# Patient Record
Sex: MALE | Race: WHITE
[De-identification: names, ages, dates, MRNs, and addresses within clinical notes are randomized per-mention and may not be internally consistent; named-entity substitution may affect disease eponyms.]

---

## 2018-03-13 ENCOUNTER — HOSPITAL ENCOUNTER (EMERGENCY)
Dept: HOSPITAL 80 - FED | Age: 53
Discharge: HOME | End: 2018-03-13
Payer: COMMERCIAL

## 2018-03-13 VITALS
HEART RATE: 74 BPM | RESPIRATION RATE: 16 BRPM | OXYGEN SATURATION: 93 % | TEMPERATURE: 97.9 F | SYSTOLIC BLOOD PRESSURE: 159 MMHG | DIASTOLIC BLOOD PRESSURE: 95 MMHG

## 2018-03-13 DIAGNOSIS — R41.89: ICD-10-CM

## 2018-03-13 DIAGNOSIS — G40.409: Primary | ICD-10-CM

## 2018-03-13 NOTE — EDPHY
H & P


Stated Complaint: siezures starting last Thurs, cognitive changes, ? withdrawl 

from ambien


Time Seen by Provider: 03/13/18 13:28





- Personal History


Current Tetanus/Diphtheria Vaccine: Yes


Current Tetanus Diphtheria and Acellular Pertussis (TDAP): Yes


Tetanus Vaccine Date: 2012





- Medical/Surgical History


Hx Asthma: No


Hx Chronic Respiratory Disease: No


Hx Diabetes: No


Hx Cardiac Disease: No


Hx Renal Disease: No


Hx Cirrhosis: No


Hx Alcoholism: No


Hx HIV/AIDS: No


Hx Splenectomy or Spleen Trauma: No


Other PMH: hyperlipidemia





- Social History


Smoking Status: Never smoked


Constitutional: 


 Initial Vital Signs











Temperature (C)  37.1 C   03/13/18 12:21


 


Heart Rate  76   03/13/18 12:21


 


Respiratory Rate  1 L  03/13/18 12:21


 


Blood Pressure  161/101 H  03/13/18 12:21


 


O2 Sat (%)  95   03/13/18 12:21








 











O2 Delivery Mode               Room Air














Allergies/Adverse Reactions: 


 





aspirin Allergy (Intermediate, Verified 03/13/18 12:20)


 


Sulfa (Sulfonamide Antibiotics) [Sulfa(Sulfonamide Antibiotics)] Allergy (

Verified 03/13/18 12:20)


 








Home Medications: 














 Medication  Instructions  Recorded


 


Atorvastatin Calcium [Lipitor 40 40 mg PO DAILY 09/14/13





mg (RX)]  














Medical Decision Making





- Diagnostics


Imaging: Discussed imaging studies w/ On call Radiologist, I viewed and 

interpreted images myself


ED Course/Re-evaluation: 





CHIEF COMPLAINT:  Cognitive changes and arm "seizures"





HISTORY OF PRESENT ILLNESS:  The patient is a 52 y/o male complaining of 

cognitive issues and right arm "seizures" for the last 3 weeks since stopping 

Ambien use. He says, "I've been having some disorientation that started 3 weeks 

ago," which he describes as "lane-vu with anxiety and panic in there that 

passes quickly." Last Thursday, 5 days ago, "I started having a physical 

seizure in my arm that has occurred continuously every 5 minutes." He describes 

this as a right arm spasm that he can't control and occasional an associated 

small jerk in his right leg. He correlates his symptoms with stopping Ambien 

and reports he had no symptoms prior to discontinuance of this medication. He 

saw his PCP today for this and had labs drawn and an EEG ordered. He denies 

neck or back pain, headache, fever, recent trauma, or infectious symptoms. He 

has been able to exercise and ride his bike through these "seizures," but is 

able to pull his hand off the bike when they occur. He is normally healthy, but 

notes he's been under a lot of stress recently. 





REVIEW OF SYSTEMS:  





A 10 point review of systems was performed and is negative with the exception 

of the elements mentioned in the history of present illness.





PHYSICAL EXAM:  





HR, BP, O2 Sat, RR.  Temp noted


General Appearance:  Alert, well hydrated, appropriate, and non-toxic appearing.


Head:  Atraumatic without scalp tenderness or obvious injury


Eyes:  Pupils equal, round, reactive to light and accommodation, EOMI, no trauma

, no injection.


Nose:  Atraumatic, no rhinorrhea, clear.


Throat: Mucus membranes moist.


Neck:  Supple, nontender, no lymphadenopathy.


Respiratory:  No retractions, no distress, no wheezes, and no accessory muscle 

use.  Lungs are clear to auscultation bilaterally.


Cardiovascular:  Regular rate and rhythm, no murmurs, rubs, or gallops. Good 

capillary refill all extremities.


Gastrointestinal:  Abdomen is soft, nontender, non-distended, no masses, no 

rebound, no guarding, no peritoneal signs.


Musculoskeletal:  Normal active ROM of all extremities, atraumatic.


Neurological:  Alert, appropriate, and interactive. Isolated right upper 

extremity contracture that lasts 2-3 seconds and includes hand, otherwise non-

focal.


Skin:  No rashes, good turgor, no nodules on palpation.





Past medical history: Hyperlipidemia


Past surgical history: Denies


Family history: Noncontributory


Social history: Lives in [place], , employed.





DIAGNOSTICS/PROCEDURES/CRITICAL CARE TIME:  





Head CT and CTA: negative





DIFFERENTIAL DIAGNOSIS:   The differential diagnosis for the patient's seizure 

included but was not limited to electrolyte abnormality, alcohol withdrawal, 

medication noncompliance, head injury, CNS structural abnormality, and break 

through seizure.





MEDICAL DECISION MAKING:  





This is a normally healthy 52 y/o male who presents with a 3-week history of 

cognitive changes described as "disorientation" and lane vu" now associated 

with a right upper arm spasm every few minutes for the last 5 days. During 

assessment he does appear to have an involuntary 2-3 second contracture of his 

right arm with finger involvement. Otherwise normal neuro exam. He does appear 

quite anxious. Labs performed by his PCP this morning were normal. Plan for 

head CT and CTA to rule out structural causes for what appears to be a brief 

focal motor seizure. 





Reassessed patient and discussed results. Imaging is negative. Patient will be 

referred to neurology for follow up with standard precautions. He is 

comfortable with this plan. 





Departure





- Departure


Disposition: Home, Routine, Self-Care


Clinical Impression: 


 Cognitive changes, Focal motor seizure disorder, of right arm





Condition: Good


Instructions:  New-Onset Seizure in Adults (ED)


Additional Instructions: 


Follow up with neurologist this week. Do perform activities that could put you 

or others at risk in case of recurrent seizure until cleared by neurologist. 

Return to the ED for any worsening of condition. 


Referrals: 


Syeda Verdugo PA [Primary Care Provider] - As per Instructions


Dewey Ayers MD [Medical Doctor] - As per Instructions


Report Scribed for: Arden Thakur


Report Scribed by: Brie Monique


Date of Report: 03/13/18


Time of Report: 13:54

## 2018-03-15 ENCOUNTER — HOSPITAL ENCOUNTER (OUTPATIENT)
Dept: HOSPITAL 80 - FED | Age: 53
Setting detail: OBSERVATION
LOS: 1 days | Discharge: HOME | End: 2018-03-16
Attending: INTERNAL MEDICINE | Admitting: INTERNAL MEDICINE
Payer: COMMERCIAL

## 2018-03-15 VITALS — RESPIRATION RATE: 16 BRPM

## 2018-03-15 DIAGNOSIS — F43.22: ICD-10-CM

## 2018-03-15 DIAGNOSIS — Z87.891: ICD-10-CM

## 2018-03-15 DIAGNOSIS — E78.5: ICD-10-CM

## 2018-03-15 DIAGNOSIS — G47.00: ICD-10-CM

## 2018-03-15 DIAGNOSIS — Z88.2: ICD-10-CM

## 2018-03-15 DIAGNOSIS — R56.9: Primary | ICD-10-CM

## 2018-03-15 LAB — PLATELET # BLD: 404 10^3/UL (ref 150–400)

## 2018-03-15 PROCEDURE — 93005 ELECTROCARDIOGRAM TRACING: CPT

## 2018-03-15 PROCEDURE — G0378 HOSPITAL OBSERVATION PER HR: HCPCS

## 2018-03-15 PROCEDURE — 96374 THER/PROPH/DIAG INJ IV PUSH: CPT

## 2018-03-15 PROCEDURE — 99285 EMERGENCY DEPT VISIT HI MDM: CPT

## 2018-03-15 PROCEDURE — 70551 MRI BRAIN STEM W/O DYE: CPT

## 2018-03-15 NOTE — GHP
[f rep st]



                                                            HISTORY AND PHYSICAL





DATE OF ADMISSION:  03/15/2018



CHIEF COMPLAINT:  Rhythmic spasms of the right upper extremity and face.



HISTORY OF PRESENT ILLNESS:  A 53-year-old male with limited past medical history beyond hyperlipidem
ia and insomnia, who presents with 3 weeks of transient episodes that he describes as mild confusion,
 disorientation, and a sense of lane vu.  Patient would have these moments last for several seconds a
nd then spontaneously resolve.  These episodes then transformed or were additionally experienced with
 rhythmic contraction of the right upper extremity and right cheek and face.  Patient reports these e
pisodes have been occurring multiple times per day, not always associated with these moments of tempo
rary confusion and lane vu.  Patient denies any preceding history of episodes like this.  Sought care
 yesterday, had a noncontrast CT of the head which was normal.  Patient denies any localized numbness
, tingling or weakness.  Denies any large seizures with loss of consciousness.  Patient has been unde
r a lot of recent stress with the terminal illness of his brother.  His brother passed away yesterday
.  He does report that he has increased his alcohol intake to multiple drinks per evening where typic
ally it would be 2-3 a week.  He additionally has stopped his daily use of Ambien 10 mg when the lane
 vu episodes began 3 weeks ago, thinking that it may have somehow contributed.



PAST MEDICAL HISTORY:  

1.  Hyperlipidemia.

2.  Severe seasonal allergies.



FAMILY HISTORY:  Negative for seizure disorders or known neurologic conditions.



SOCIAL HISTORY:  Patient was a previous smoker for approximately 15 years, quit approximately 15 year
s ago.  Drinks on average 2-4 drinks a week until recently with his brother's illness, now he has bee
n having 3-4 a day.  Denies any illicit drugs or marijuana.



REVIEW OF SYSTEMS:  A 10-point review of systems is negative with the exception of that reported in t
he HPI.



PHYSICAL EXAMINATION:  VITAL SIGNS:  Blood pressure 133/90, heart rate 69, respiratory rate 16, 96% o
n room air. GENERAL: This is a young healthy-appearing male, sitting up in a chair.  HEENT:  Notable 
for moist mucous membranes.  Eye exam is negative for any icterus. CARDIAC:  Patient is regular rate 
and rhythm. PULMONARY: Clear to auscultation bilaterally. GASTROINTESTINAL: Positive bowel sounds.  A
BDOMEN:  Soft and nontender. MUSCULOSKELETAL:  Negative for any lower extremity edema. SKIN: Negative
 for any rashes. NEUROLOGIC: Patient is alert and oriented x3.  Strength is 5/5 of bilateral upper an
d lower extremities.  Sensation is intact throughout.  Cranial nerves 2-12 are grossly intact.  Patie
nt had multiple episodes of his right upper extremity and facial twitching/contractions.  They lasted
 for seconds only and patient was clear and responding to questions immediately after these episodes.
  Patient additionally was able to answer questions that I was asking during the episode without conf
usion or the intimation he had lost information while having these rhythmic contractions.  Patient ad
ditionally had 2 episodes of his lane vu sensation also without postictal confusion or any disruption
 in his ability to take in information while these episodes were occurring.



DATA:  MRI of the brain is normal.



LABORATORY:  White count 8.8, hematocrit 45.2, platelets of 404.  Creatinine is 0.8.



ASSESSMENT AND PLAN:  This is a 53-year-old male presenting with right upper extremity and facial con
tractions.

1.  Acute episodes of rhythmic right upper extremity facial twitching/contractions.  Patient did have
 multiple of these episodes during my interview and examination.  They do not appear classic for seiz
ure activity and that the patient's consciousness is not disrupted during these episodes as well as h
is ability to appropriately take in information during an episode and respond immediately after accor
dingly.  Patient received IV load of Keppra in the emergency department.  Initial brain imaging with 
CT and MRI are normal.  I have consulted Neurology to make recommendations related to ongoing treatme
nt for seizures as well as additional diagnostics for seizure.  We will not order any additional imag
ing studies or medications at this time until Dr. Ayers has evaluated the patient.

2.  Acute grief/anxiety.  It is very possible that the patient's immediate loss of his younger brothe
r is contributing to his neurologic experience.  Patient reports that the lane vu episodes did begin 
when he was first receiving the information that his brother had a terminal illness and when he concu
rrently weaned himself off Ambien.  Will not empirically treat with any anti anxiety medications at t
his time.  Again, wait for neurologic consultation and recommendations.

3.  Hyperlipidemia.  Will continue patient's statin therapy.

4.  Prophylaxis with Lovenox.



DIET:  Regular.



DISPOSITION:  I expect in less than 2 midnights depending on the patient's neurologic consultation an
d clinical progress in his first 24 hours of hospitalization.  [facility]  Patient will be triaged to the medical-surgical floor for care.





Job #:  [id number]

## 2018-03-15 NOTE — CPEKG
Heart Rate: 62

RR Interval: 968

P-R Interval: 152

QRSD Interval: 88

QT Interval: 420

QTC Interval: 427

P Axis: 50

QRS Axis: 51

T Wave Axis: 37

EKG Severity - NORMAL ECG -

EKG Impression: SINUS RHYTHM

Electronically Signed By: Jane Reid 15-Mar-2018 14:53:25

## 2018-03-15 NOTE — EDPHY
H & P


Time Seen by Provider: 03/15/18 12:10


HPI/ROS: 





CHIEF COMPLAINT: Seizures





HISTORY OF PRESENT ILLNESS: 





This patient is a 52 y/o male arriving at the request of his neurologist, Dr. Borges, for evaluation of focal seizures. Three weeks ago, he began to have 5 

second episodes of lane vu and visual hallucinations, which he describes as a 

"scene that is not really present". He feels disoriented during these episodes, 

and they occur every 15 minutes. No associated dizziness or nausea. One week ago

, he began having a right-sided arm spasms as well. During these episodes, the 

right side of his face clenches, his right hand clenches and shakes, and his 

right arm draws towards his chest involuntarily. Now the right side of his face 

and his right leg are involved and the seizure-like activity.  Afterwards, he 

feels an "adrenaline rush". No pain associated. He denies neck pain or recent 

chiropractic manipulation. He notes his symptoms began after he stopped taking 

Ambien. Additionally, he has been under a considerable amount of stress 

recently. His brother had been very ill and passed away yesterday. He endorses 

increased alcohol use in the past week. The patient was evaluated 3/13/18 for 

similar symptoms. CT head was negative at that time. No recent trauma or 

illness. Dr. Borges recommends MRI, IV Keppra, and admission for further 

evaluation. 





REVIEW OF SYSTEMS:


A 10 point review of systems was performed and is negative with the exception 

of the elements mentioned in the history of present illness. 





Past Medical/Surgical History: 





Hyperlipidemia





Social History: 





Recent alcohol use of 2-3 drinks per night related to increased stress. 

. Wife at bedside. Lives in [place]. Employed. Nonsmoker. 





Smoking Status: Never smoked


Physical Exam: 





General Appearance: Alert, pleasant. During my exam, 2 episodes of right facial 

twitching, right arm involuntary flexion, and involuntary movement of the right 

leg.


Eyes: Pupils equal and round, no conjunctival pallor or injection


ENT, Mouth: Mucous membranes moist


Neck: Normal inspection


Respiratory: Lungs are clear to auscultation


Cardiovascular: Regular rate and rhythm 


Gastrointestinal:  Abdomen is soft and non-tender 


Neurological:  Alert, oriented x3, cranial nerves II through XII intact, motor 5

/5, sensory intact to light touch, normal gait


Skin:  Warm and dry, no rash


Extremities:  Nontender, no pedal edema


Psychiatric: Mood and affect normal





Constitutional: 


 Initial Vital Signs











Temperature (C)  36.9 C   03/15/18 12:02


 


Heart Rate  76   03/15/18 12:02


 


Respiratory Rate  16   03/15/18 12:02


 


Blood Pressure  171/109 H  03/15/18 12:02


 


O2 Sat (%)  95   03/15/18 12:02








 











O2 Delivery Mode               Room Air














Allergies/Adverse Reactions: 


 





aspirin Allergy (Intermediate, Verified 03/15/18 12:01)


 


Sulfa (Sulfonamide Antibiotics) [Sulfa(Sulfonamide Antibiotics)] Allergy (

Verified 03/15/18 12:01)


 








Home Medications: 














 Medication  Instructions  Recorded


 


Acetaminophen [Tylenol 325mg (*)] 325 mg PO DAILY PRN 03/15/18


 


Atorvastatin Calcium [Lipitor 20 20 mg PO DAILY 03/15/18





mg (*)]  


 


Calcium Carbonate [Oyster Shell 500 mg PO DAILY 03/15/18





Calcium 500 mg (*)]  


 


Fluticasone Nasal [Flonase Nasal 1 sprays NASAL DAILY PRN 03/15/18





Spray]  


 


Loratadine [Claritin 10 mg] 10 mg PO DAILY PRN 03/15/18


 


Tetrahydrozoline 0.05% [Visine (*)] 1 drop OP DAILY PRN 03/15/18


 


levETIRAcetam [Keppra 500 mg (*)] 500 mg PO BID #60 tab 03/16/18














Medical Decision Making





- Diagnostics


EKG Interpretation: 





EKG interpreted by me reveals normal sinus rhythm, rate 62, no ST/T changes.  

Interpretation: normal EKG. 





Imaging Results: 


MRI of the brain read by the radiologist is normal.





Imaging: Discussed imaging studies w/ On call Radiologist, I viewed and 

interpreted images myself


ED Course/Re-evaluation: 





52 y/o male presents for evaluation of right-sided focal seizures. He is 

neurologically intact on exam and is having frequent right-sided focal seizures 

on my exam.  Plan for MRI, EKG, labs including CBC, chemistries.  Keppra 1 g IV 

given.





EKG shows sinus rhythm, no evidence of ischemia or dysrhythmia. Laboratory 

studies unremarkable. 





12:53 Consulted with hospitalist service. Dr. Reyes accepts admission for 

focal seizures. 





13:12 Consulted with Dr. Ayers, neurologist. He will see the patient during 

his admission.  MRI is unremarkable.  Decreased seizure activity after the 

Keppra.  Patient has been stable throughout his emergency department stay.


Differential Diagnosis: 





Differential diagnosis includes though it is not limited to Ambien withdrawal, 

status epilepticus, hypoglycemia, intracranial hemorrhage, CVA, benzodiazepine 

withdrawal, alcohol withdrawal, epilepsy.





- Data Points


Laboratory Results: 


 Laboratory Results





 03/15/18 12:21 





 03/15/18 12:21 








Medications Given: 


 








Discontinued Medications





Acetaminophen (Tylenol)  650 mg PO Q4HRS PRN


   PRN Reason: Pain, Mild/Fever, Can Take PO


   Stop: 09/11/18 13:03


   Last Admin: 03/15/18 15:24 Dose:  650 mg


Atorvastatin Calcium (Lipitor)  20 mg PO DAILY [facility]


   Stop: 09/12/18 08:59


   Last Admin: 03/16/18 08:00 Dose:  Not Given


Enoxaparin Sodium (Lovenox)  40 mg SC DAILY [facility]


   Stop: 09/12/18 08:59


   Last Admin: 03/16/18 08:01 Dose:  Not Given


Levetiracetam (Keppra (Premix))  100 mls @ 400 mls/hr IV EDNOW ONE


   Stop: 03/15/18 12:23


   Last Admin: 03/15/18 12:39 Dose:  100 mls


Levetiracetam (Keppra)  500 mg PO BID MAXINE


   Stop: 09/11/18 20:59


   Last Admin: 03/16/18 08:01 Dose:  500 mg








Departure





- Departure


Disposition: [facility] In[facility] Acute


Clinical Impression: 


 Focal seizures





Condition: Fair


Report Scribed for: Jane Reid


Report Scribed by: Alma Delia Youngblood


Date of Report: 03/15/18


Time of Report: 12:11


Physician Review and Approval Statement: 





03/15/18 12:11


Portions of this note were transcribed by a medical scribe.  I personally 

performed a history, physical exam, medical decision making, and confirmed 

accuracy of information the transcribed note.

## 2018-03-16 VITALS
TEMPERATURE: 97.5 F | DIASTOLIC BLOOD PRESSURE: 86 MMHG | HEART RATE: 69 BPM | OXYGEN SATURATION: 95 % | SYSTOLIC BLOOD PRESSURE: 142 MMHG

## 2018-03-16 NOTE — GCON
[f rep st]



                                                                    CONSULTATION





NEUROLOGIC CONSULTATION





HISTORY OF PRESENT ILLNESS:  The patient is a 53-year-old who I am asked to see in neurologic consult
ation regarding possible seizure activity.  History is obtained from review of the medical records as
 well as the history and physical from Dr. Reyes.  He has a history of developing some abnormal mov
ements over the last 3 weeks.  He had an episode where he awoke and had cut his hand.  He does not kn
ow what happened but attributed it perhaps to the use of Ambien.  He stopped that about 3 weeks ago. 
 He then started going through episodes where he would have a very strange phenomenon, like lane vu o
r brief visual hallucinations and could have a brief muscle twitch affecting the right face or arm or
 leg.  This is all something that might last 1 second or in that range.  He does not have any sustain
ed contractions or persistence of altered awareness with the events.  There is not always a feeling o
f lane vu, but the muscle twitch could occur in isolation.  The frequency is probably almost every 5 
minutes for the last week or so.  He has had some significant stress with the death of his brother.  
He has had a little bit of increase in alcohol use.  He has remained off the Ambien.



PAST MEDICAL HISTORY:  History of hyperlipidemia and allergies.  No known history of seizures.



SOCIAL HISTORY:  He has been consuming up to 3-4 drinks of alcohol per day, just since the death of h
is brother.  No other drug use or problems with substances.  He is  with children.  



No recent illness or trauma.  Dr. Reyes witnessed episode yesterday with some right upper extremity
 twitches or contractions lasting just about a second.  He was able to continue to communicate immedi
ately after the episodes.  He had a couple of feelings where he was describing that lane vu phenomena
. 



He was loaded with Keppra in the emergency room and is currently on 500 mg b.i.d.  He is tolerating t
hat perfectly well, but the episodes have not significantly changed.



ALLERGIES:  To aspirin and sulfa.



PHYSICAL EXAM:  VITAL SIGNS:  Blood pressure 142/86, pulse of 69, respirations 16, temperature 36.4. 
 GENERAL:  He is well-developed, in no acute distress.  HEENT:  Eyes are clear.  Pupils 3 mm and reac
tive.  Extraocular movements are intact.  Normal facial movement.  I did witness 1 episode where he h
ad a transient right facial contraction and head turned slightly to the left and a brief contraction 
of the right upper extremity.  This was less than 1 second.  I did not witness any other events durin
g my interview with him. NEUROLOGIC:  The motor exam reveals normal muscle bulk and tone with 5/5 str
ength.  No increased tone on the right or other discoordinated movements.



DIAGNOSTIC STUDIES:  The brain MRI was reviewed.  This is normal.



LABORATORY STUDIES:  Normal.



IMPRESSION:  The patient is experiencing episodes over the last 3 weeks with some lane vu and variabl
e degrees of motor contractions on the right face, arm, and leg.  Differential considerations include
 partial seizure phenomenon, tics, or dystonic movements.  The precipitating cause is not entirely cl
ear.  I would not expect the prolonged affects from Ambien to do this.  Because there is a lane vu or
 cognitive, transient phenomena, I would favor partial seizure phenomena over a movement disorder or 
tic.  Nonepileptic events related to manifestations of his emotional distress and/or this escalated u
se of alcohol are also possibilities, but diagnoses of exclusion.  He has been loaded with Keppra and
 will be discharged on 500 mg b.i.d.  he should follow up with Dr. Borges to arrange for outpatient EEG
 and I think he is safe to be discharged, but was instructed on what to watch for should anything sig
nificantly change.  



Total unit time of 40 minutes.



Copy requested to:

SCOTTIE Cedillo



Job #:  [id number]

## 2018-03-21 ENCOUNTER — HOSPITAL ENCOUNTER (OUTPATIENT)
Dept: HOSPITAL 80 - FCPNEURO | Age: 53
End: 2018-03-21
Attending: PSYCHIATRY & NEUROLOGY
Payer: COMMERCIAL

## 2018-03-21 DIAGNOSIS — R56.9: Primary | ICD-10-CM

## 2018-03-21 NOTE — CPEEG
[f 
rep st]



                                                      ELECTROENCEPHALOGRAM





DATE OF STUDY:  03/21/2018



INTERPRETATION:  Normal EEG during wakefulness and sleep.  There were no 
potentially epileptogenic abnormalities present during the awake or sleep 
recordings.  During the EEG, the patient reported multiple "seizures."  None of 
these events had an EEG correlate and would be consistent with nonepileptic 
events.



REPORT:  This EEG contains 9 Hz alpha over the posterior head regions.  The 
background activity was normal and symmetric.  There was no abnormal activation 
at rest, during photic stimulation or hyperventilation.  The patient 
intermittently became drowsy and fell asleep during the study.  There was no 
abnormal activation during drowsiness, sleep, or during times of arousal. 



The patient reported 6 "seizures" to the technologist.  None of these events 
had an abnormal EEG correlate.





Job #:  [id number]

MTDD

## 2018-04-11 ENCOUNTER — HOSPITAL ENCOUNTER (OUTPATIENT)
Dept: HOSPITAL 80 - FIMAGING | Age: 53
End: 2018-04-11
Attending: PSYCHIATRY & NEUROLOGY
Payer: COMMERCIAL

## 2018-04-11 DIAGNOSIS — R56.9: Primary | ICD-10-CM

## 2018-04-11 PROCEDURE — A9585 GADOBUTROL INJECTION: HCPCS
